# Patient Record
Sex: FEMALE | Race: OTHER | HISPANIC OR LATINO | URBAN - METROPOLITAN AREA
[De-identification: names, ages, dates, MRNs, and addresses within clinical notes are randomized per-mention and may not be internally consistent; named-entity substitution may affect disease eponyms.]

---

## 2020-02-10 ENCOUNTER — EMERGENCY (EMERGENCY)
Facility: HOSPITAL | Age: 20
LOS: 0 days | Discharge: HOME | End: 2020-02-10
Payer: COMMERCIAL

## 2020-02-10 VITALS
RESPIRATION RATE: 18 BRPM | HEART RATE: 82 BPM | DIASTOLIC BLOOD PRESSURE: 72 MMHG | HEIGHT: 68 IN | TEMPERATURE: 98 F | OXYGEN SATURATION: 99 % | WEIGHT: 117.95 LBS | SYSTOLIC BLOOD PRESSURE: 112 MMHG

## 2020-02-10 DIAGNOSIS — R51 HEADACHE: ICD-10-CM

## 2020-02-10 DIAGNOSIS — M54.2 CERVICALGIA: ICD-10-CM
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
## 2020-02-10 DIAGNOSIS — Z79.1 LONG TERM (CURRENT) USE OF NON-STEROIDAL ANTI-INFLAMMATORIES (NSAID): ICD-10-CM
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
## 2020-02-10 PROCEDURE — 99283 EMERGENCY DEPT VISIT LOW MDM: CPT

## 2020-02-10 RX ORDER — IBUPROFEN 200 MG
1 TABLET ORAL
Qty: 20 | Refills: 0
Start: 2020-02-10 | End: 2020-02-14

## 2020-02-10 RX ORDER — LIDOCAINE 4 G/100G
1 CREAM TOPICAL ONCE
Refills: 0 | Status: COMPLETED | OUTPATIENT
Start: 2020-02-10 | End: 2020-02-10

## 2020-02-10 RX ORDER — DIAZEPAM 5 MG
1 TABLET ORAL
Qty: 6 | Refills: 0
Start: 2020-02-10 | End: 2020-02-11

## 2020-02-10 RX ORDER — DIAZEPAM 5 MG
5 TABLET ORAL ONCE
Refills: 0 | Status: DISCONTINUED | OUTPATIENT
Start: 2020-02-10 | End: 2020-02-10

## 2020-02-10 RX ORDER — IBUPROFEN 200 MG
600 TABLET ORAL ONCE
Refills: 0 | Status: COMPLETED | OUTPATIENT
Start: 2020-02-10 | End: 2020-02-10

## 2020-02-10 RX ADMIN — Medication 5 MILLIGRAM(S): at 21:27

## 2020-02-10 RX ADMIN — LIDOCAINE 1 PATCH: 4 CREAM TOPICAL at 21:28

## 2020-02-10 RX ADMIN — LIDOCAINE 1 PATCH: 4 CREAM TOPICAL at 22:31

## 2020-02-10 RX ADMIN — Medication 600 MILLIGRAM(S): at 21:27

## 2020-02-10 RX ADMIN — Medication 600 MILLIGRAM(S): at 21:57
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
## 2020-02-10 NOTE — ED PROVIDER NOTE - NSFOLLOWUPINSTRUCTIONS_ED_ALL_ED_FT
Take ibuprofen 600mg every 6 hours as needed for pain.   Take Valium 1 tab every 6 hours as needed for neck pain. Do not drive, drink alcohol, or operate heavy machinery when taking this pill as it can make you drowsy.   Put warm compresses on your neck 20min at a time as much as you can when you are home.   Follow up with your PMD by the end of the week if you symptoms do not begin to improve.   Return to ED if you experience: worsening headache, visual changes, weakness, dizziness, nausea/vomiting, numbness/tingling or other concerning symptoms. Take ibuprofen 600mg every 6 hours as needed for pain.   Take Valium 1 tab every 6-8 hours as needed for neck pain. Do not drive, drink alcohol, or operate heavy machinery when taking this pill as it can make you drowsy.   Put warm compresses on your neck 20min at a time as much as you can when you are home.   Follow up with your PMD by the end of the week if you symptoms do not begin to improve.   Return to ED if you experience: worsening headache, visual changes, weakness, dizziness, nausea/vomiting, numbness/tingling or other concerning symptoms.
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
## 2020-02-10 NOTE — ED PROVIDER NOTE - PROGRESS NOTE DETAILS
Supervising Statement (LITA Engle): I have personally seen and examined this patient.  I have fully participated in the care of this patient. I have reviewed all pertinent clinical information, including history, physical exam, plan and the ACP Fellow's note and agree except as noted. Pt reports improvement after medications. Will send home with ibuprofen, valium and supportive care with PMD follow up and strict return precautions

## 2020-02-10 NOTE — ED PROVIDER NOTE - MUSCULOSKELETAL MINIMAL EXAM
muscle tenderness in the upper back and neck. No midline tenderness along the spine. FROM without pain of neck and b/l upper extremities. No evident bony deformities, erythema or edema./atraumatic/normal range of motion

## 2020-02-10 NOTE — ED PROVIDER NOTE - PATIENT PORTAL LINK FT
You can access the FollowMyHealth Patient Portal offered by [facility] by registering at the following website: [email or web address]. By joining [employer]’s FollowMyHealth portal, you will also be able to view your health information using other applications (apps) compatible with our system.

## 2020-10-14 ENCOUNTER — EMERGENCY (EMERGENCY)
Facility: HOSPITAL | Age: 20
LOS: 1 days | Discharge: HOME | End: 2020-10-14
Attending: STUDENT IN AN ORGANIZED HEALTH CARE EDUCATION/TRAINING PROGRAM
Payer: COMMERCIAL

## 2020-10-14 VITALS
WEIGHT: 117.95 LBS | OXYGEN SATURATION: 98 % | SYSTOLIC BLOOD PRESSURE: 111 MMHG | DIASTOLIC BLOOD PRESSURE: 73 MMHG | HEIGHT: 60 IN | RESPIRATION RATE: 18 BRPM | TEMPERATURE: 98 F | HEART RATE: 90 BPM

## 2020-10-14 PROCEDURE — 12011 RPR F/E/E/N/L/M 2.5 CM/<: CPT

## 2020-10-14 PROCEDURE — 99283 EMERGENCY DEPT VISIT LOW MDM: CPT

## 2020-10-14 PROCEDURE — 99283 EMERGENCY DEPT VISIT LOW MDM: CPT | Mod: 25

## 2020-10-14 PROCEDURE — 90471 IMMUNIZATION ADMIN: CPT

## 2020-10-14 PROCEDURE — 90715 TDAP VACCINE 7 YRS/> IM: CPT

## 2020-10-14 RX ORDER — TETANUS TOXOID, REDUCED DIPHTHERIA TOXOID AND ACELLULAR PERTUSSIS VACCINE, ADSORBED 5; 2.5; 8; 8; 2.5 [IU]/.5ML; [IU]/.5ML; UG/.5ML; UG/.5ML; UG/.5ML
0.5 SUSPENSION INTRAMUSCULAR ONCE
Refills: 0 | Status: COMPLETED | OUTPATIENT
Start: 2020-10-14 | End: 2020-10-14

## 2020-10-14 RX ORDER — ACETAMINOPHEN 500 MG
650 TABLET ORAL ONCE
Refills: 0 | Status: COMPLETED | OUTPATIENT
Start: 2020-10-14 | End: 2020-10-14

## 2020-10-14 RX ADMIN — TETANUS TOXOID, REDUCED DIPHTHERIA TOXOID AND ACELLULAR PERTUSSIS VACCINE, ADSORBED 0.5 MILLILITER(S): 5; 2.5; 8; 8; 2.5 SUSPENSION INTRAMUSCULAR at 14:12

## 2020-10-14 RX ADMIN — Medication 650 MILLIGRAM(S): at 14:42

## 2020-10-14 RX ADMIN — Medication 650 MILLIGRAM(S): at 14:12

## 2020-10-14 NOTE — ED PROVIDER NOTE - ATTENDING CONTRIBUTION TO CARE
I performed the initial face to face bedside interview with this patient regarding history of present illness, review of symptoms and past medical, social and family history.  I completed an independent physical examination.  I was the initial provider who evaluated this patient.  The history, review of symptoms and examination was documented by the scribe in my presence and I attest to the accuracy of the documentation.  I have signed out the follow up of any pending tests (i.e. labs, radiological studies) to the PA/NP.  I have discussed the patient’s plan of care and disposition with the PA/NP.   Well appearing female with small right forehead lac. no erythema or drainage. wound < 12hours old so will repair.

## 2020-10-14 NOTE — ED PROVIDER NOTE - SKIN, MLM
1.0 cm gaping linear laceration to the right scalp. No surrounding erythema or streaking. 1.0 cm gaping linear laceration to the right scalp. No surrounding erythema or streaking.  Mild swelling around wound.

## 2020-10-14 NOTE — ED PROVIDER NOTE - CLINICAL SUMMARY MEDICAL DECISION MAKING FREE TEXT BOX
20 year old female presents to the ED with complaints of a facial laceration. Will repair wound, update tetanus vaccination, and provide Tylenol for pain.

## 2020-10-14 NOTE — ED PROVIDER NOTE - NSFOLLOWUPINSTRUCTIONS_ED_ALL_ED_FT

## 2020-10-14 NOTE — ED PROCEDURE NOTE - ATTENDING CONTRIBUTION TO CARE
I performed the initial face to face bedside interview with this patient regarding history of present illness, review of symptoms and past medical, social and family history.  I completed an independent physical examination.  I was the initial provider who evaluated this patient.  The history, review of symptoms and examination was documented by the scribe in my presence and I attest to the accuracy of the documentation.  I have signed out the follow up of any pending tests (i.e. labs, radiological studies) to the PA/NP.  I have discussed the patient’s plan of care and disposition with the PA/NP. well appearing, no signs of infection. lac repair.

## 2020-10-14 NOTE — ED PROVIDER NOTE - PATIENT PORTAL LINK FT
You can access the FollowMyHealth Patient Portal offered by [facility] by registering at the following website: [email or web address]. By joining [employer]’s FollowMyHealth portal, you will also be able to view your health information using other applications (apps) compatible with our system.

## 2020-10-14 NOTE — ED PROVIDER NOTE - OBJECTIVE STATEMENT
20 year old female with PMHx of migraines and heart murmur presents to the ED with complaints of a right forehead laceration last night. Patient endorses that at approximately midnight she was moving a box which then fell from a shelf onto her head, causing her to sustain the laceration. Patient reports that following the injury she washed the wound, applied Neosporin, and bandaged the area before going to sleep. Patient states that when she woke up this morning she noticed that the wound was still bleeding, causing her to visit the ED. Patient denies any loss of consciousness, neck pain, back pain, or any other injuries. Patient endorses a mild headache, for which she states she took Advil prior to arrival with minimal relief. Patient states that she is unsure as to when her tetanus vaccination was last updated. NKDA.

## 2020-10-14 NOTE — ED PROVIDER NOTE - MUSCULOSKELETAL, MLM
Spine appears normal, range of motion is not limited, no muscle or joint tenderness. No spinal tenderness.

## 2024-01-07 ENCOUNTER — NON-APPOINTMENT (OUTPATIENT)
Age: 24
End: 2024-01-07

## 2024-12-13 NOTE — ED PROVIDER NOTE - GASTROINTESTINAL, MLM
[de-identified] : This 47-year-old is seen today for evaluation of neck left shoulder and low back region she was well until 5 days ago when she tripped and fell sustaining injury.  This has caused significant pain and stiffness to the above sites.  She has great difficulty with motion of the left shoulder and placement out.  No obvious numbness motor weakness bladder bowel dysfunction no head injury.  Prior to that she had been doing well
Abdomen soft, non-tender, no guarding.